# Patient Record
Sex: FEMALE | Race: WHITE | NOT HISPANIC OR LATINO | URBAN - METROPOLITAN AREA
[De-identification: names, ages, dates, MRNs, and addresses within clinical notes are randomized per-mention and may not be internally consistent; named-entity substitution may affect disease eponyms.]

---

## 2022-09-16 ENCOUNTER — OFFICE (OUTPATIENT)
Dept: URBAN - METROPOLITAN AREA CLINIC 11 | Facility: CLINIC | Age: 72
End: 2022-09-16

## 2022-09-16 VITALS
DIASTOLIC BLOOD PRESSURE: 88 MMHG | HEIGHT: 63 IN | SYSTOLIC BLOOD PRESSURE: 136 MMHG | HEART RATE: 74 BPM | OXYGEN SATURATION: 98 % | WEIGHT: 119 LBS

## 2022-09-16 DIAGNOSIS — Z86.010 PERSONAL HISTORY OF COLONIC POLYPS: ICD-10-CM

## 2022-09-16 PROCEDURE — 99203 OFFICE O/P NEW LOW 30 MIN: CPT

## 2022-09-16 NOTE — SERVICEHPINOTES
Deja some extremely pleasant 72-year-old white female who presents to establish GI care and discuss surveillance colonoscopy.   Patient had normal colonoscopies in 2007 and 2012. Last colonoscopy 11/15/2019 with 10 and 5mm polyps in cecum, 2 polyps 4-7 mm in size and transverse colon, 6 mm polyp in sigmoid (All TA's), medium-size internal hemorrhoids.  She has family history of colon cancer in her mother who passed at age 84.  She reports her sister also has a history of polyps.  Patient is currently asymptomatic, specifically denies any nausea, vomiting, heartburn, reflux, dysphagia, odynophagia, decreased appetite or unintentional weight loss.  She reports regular bowel habits, with bowel movement 1 to 2 times a day of normal color and consistency without blood or mucus.  No abdominal pain.  She is not on any blood thinners.  She denies any tobacco, drug or NSAID use.  She does endorse alcohol consumption, approximately 1 glass of bourbon per day.  She denies any known cardiac, pulmonary, renal, hematologic or neurologic disorders.

## 2022-11-17 ENCOUNTER — AMBULATORY SURGICAL CENTER (OUTPATIENT)
Dept: URBAN - METROPOLITAN AREA SURGERY 3 | Facility: SURGERY | Age: 72
End: 2022-11-17
Payer: MEDICARE

## 2022-11-17 ENCOUNTER — AMBULATORY SURGICAL CENTER (OUTPATIENT)
Dept: URBAN - METROPOLITAN AREA SURGERY 3 | Facility: SURGERY | Age: 72
End: 2022-11-17

## 2022-11-17 ENCOUNTER — OFFICE (OUTPATIENT)
Dept: URBAN - METROPOLITAN AREA PATHOLOGY 22 | Facility: PATHOLOGY | Age: 72
End: 2022-11-17

## 2022-11-17 VITALS
SYSTOLIC BLOOD PRESSURE: 137 MMHG | HEART RATE: 56 BPM | HEART RATE: 62 BPM | OXYGEN SATURATION: 97 % | HEART RATE: 62 BPM | RESPIRATION RATE: 14 BRPM | HEIGHT: 63 IN | HEART RATE: 53 BPM | RESPIRATION RATE: 17 BRPM | DIASTOLIC BLOOD PRESSURE: 75 MMHG | SYSTOLIC BLOOD PRESSURE: 87 MMHG | RESPIRATION RATE: 14 BRPM | DIASTOLIC BLOOD PRESSURE: 79 MMHG | OXYGEN SATURATION: 97 % | HEART RATE: 53 BPM | TEMPERATURE: 98.2 F | DIASTOLIC BLOOD PRESSURE: 46 MMHG | SYSTOLIC BLOOD PRESSURE: 132 MMHG | DIASTOLIC BLOOD PRESSURE: 79 MMHG | DIASTOLIC BLOOD PRESSURE: 46 MMHG | DIASTOLIC BLOOD PRESSURE: 56 MMHG | HEART RATE: 67 BPM | RESPIRATION RATE: 16 BRPM | SYSTOLIC BLOOD PRESSURE: 128 MMHG | HEART RATE: 61 BPM | WEIGHT: 118 LBS | DIASTOLIC BLOOD PRESSURE: 56 MMHG | HEART RATE: 61 BPM | SYSTOLIC BLOOD PRESSURE: 87 MMHG | OXYGEN SATURATION: 98 % | TEMPERATURE: 98.4 F | SYSTOLIC BLOOD PRESSURE: 132 MMHG | SYSTOLIC BLOOD PRESSURE: 87 MMHG | SYSTOLIC BLOOD PRESSURE: 128 MMHG | TEMPERATURE: 98.2 F | TEMPERATURE: 98.2 F | HEART RATE: 56 BPM | HEART RATE: 67 BPM | DIASTOLIC BLOOD PRESSURE: 46 MMHG | OXYGEN SATURATION: 96 % | WEIGHT: 118 LBS | SYSTOLIC BLOOD PRESSURE: 92 MMHG | HEART RATE: 67 BPM | TEMPERATURE: 98.4 F | HEART RATE: 56 BPM | HEIGHT: 63 IN | SYSTOLIC BLOOD PRESSURE: 104 MMHG | SYSTOLIC BLOOD PRESSURE: 128 MMHG | OXYGEN SATURATION: 97 % | SYSTOLIC BLOOD PRESSURE: 92 MMHG | DIASTOLIC BLOOD PRESSURE: 56 MMHG | DIASTOLIC BLOOD PRESSURE: 75 MMHG | OXYGEN SATURATION: 96 % | DIASTOLIC BLOOD PRESSURE: 79 MMHG | DIASTOLIC BLOOD PRESSURE: 65 MMHG | DIASTOLIC BLOOD PRESSURE: 65 MMHG | WEIGHT: 118 LBS | SYSTOLIC BLOOD PRESSURE: 132 MMHG | DIASTOLIC BLOOD PRESSURE: 65 MMHG | OXYGEN SATURATION: 98 % | RESPIRATION RATE: 16 BRPM | DIASTOLIC BLOOD PRESSURE: 75 MMHG | SYSTOLIC BLOOD PRESSURE: 92 MMHG | HEIGHT: 63 IN | RESPIRATION RATE: 16 BRPM | RESPIRATION RATE: 14 BRPM | SYSTOLIC BLOOD PRESSURE: 137 MMHG | HEART RATE: 61 BPM | HEART RATE: 53 BPM | HEART RATE: 62 BPM | SYSTOLIC BLOOD PRESSURE: 104 MMHG | OXYGEN SATURATION: 98 % | TEMPERATURE: 98.4 F | SYSTOLIC BLOOD PRESSURE: 137 MMHG | SYSTOLIC BLOOD PRESSURE: 104 MMHG | RESPIRATION RATE: 17 BRPM | RESPIRATION RATE: 17 BRPM | OXYGEN SATURATION: 96 %

## 2022-11-17 DIAGNOSIS — D12.0 BENIGN NEOPLASM OF CECUM: ICD-10-CM

## 2022-11-17 DIAGNOSIS — D12.5 BENIGN NEOPLASM OF SIGMOID COLON: ICD-10-CM

## 2022-11-17 DIAGNOSIS — D12.4 BENIGN NEOPLASM OF DESCENDING COLON: ICD-10-CM

## 2022-11-17 DIAGNOSIS — Z86.010 PERSONAL HISTORY OF COLONIC POLYPS: ICD-10-CM

## 2022-11-17 PROBLEM — K63.5 POLYP OF COLON: Status: ACTIVE | Noted: 2022-11-17

## 2022-11-17 PROCEDURE — 45380 COLONOSCOPY AND BIOPSY: CPT | Mod: PT,59 | Performed by: INTERNAL MEDICINE

## 2022-11-17 PROCEDURE — 45385 COLONOSCOPY W/LESION REMOVAL: CPT | Mod: PT | Performed by: INTERNAL MEDICINE

## 2022-11-17 PROCEDURE — 45380 COLONOSCOPY AND BIOPSY: CPT | Mod: 59,PT | Performed by: INTERNAL MEDICINE

## 2022-11-17 PROCEDURE — G8918 PT W/O PREOP ORDER IV AB PRO: HCPCS | Performed by: INTERNAL MEDICINE

## 2022-11-17 NOTE — SERVICEHPINOTES
Mrs. Harrell is a 73 yo WF here for surveillance colonoscopy (Last colon 11/15 with several adenomas including one 10 mm)

## 2022-11-17 NOTE — SERVICEHPINOTES
Mrs. Harrell is a 71 yo WF here for surveillance colonoscopy (Last colon 11/15 with several adenomas including one 10 mm)

## 2024-10-10 NOTE — SERVICENOTES
You were treated for difficulty breathing. Your stress test was normal. You were seen by Cardiology and Pulmonology. Your medications were changed, please take them as listed in this paperwork. Please followup with Cardiology or your primary care doctor to obtain an ultrasound of your heart (echocardiogram). Please followup with Pulmonology to obtain pulmonary function testing. (PFTs).  
Start Time: 7:49
Cecum: 7:53
TI Intubation: No
End time: 8:09